# Patient Record
Sex: MALE | Race: OTHER | HISPANIC OR LATINO | URBAN - METROPOLITAN AREA
[De-identification: names, ages, dates, MRNs, and addresses within clinical notes are randomized per-mention and may not be internally consistent; named-entity substitution may affect disease eponyms.]

---

## 2018-01-01 ENCOUNTER — OUTPATIENT (OUTPATIENT)
Dept: OUTPATIENT SERVICES | Facility: HOSPITAL | Age: 53
LOS: 1 days | End: 2018-01-01
Payer: MEDICAID

## 2018-01-01 PROCEDURE — G9001: CPT

## 2018-01-05 DIAGNOSIS — R69 ILLNESS, UNSPECIFIED: ICD-10-CM

## 2018-01-27 ENCOUNTER — EMERGENCY (EMERGENCY)
Facility: HOSPITAL | Age: 53
LOS: 1 days | Discharge: HOME | End: 2018-01-27
Attending: EMERGENCY MEDICINE | Admitting: EMERGENCY MEDICINE
Payer: COMMERCIAL

## 2018-01-27 VITALS
OXYGEN SATURATION: 98 % | DIASTOLIC BLOOD PRESSURE: 86 MMHG | HEIGHT: 68 IN | HEART RATE: 110 BPM | RESPIRATION RATE: 18 BRPM | TEMPERATURE: 99 F | WEIGHT: 279.99 LBS | SYSTOLIC BLOOD PRESSURE: 148 MMHG

## 2018-01-27 VITALS
HEART RATE: 94 BPM | OXYGEN SATURATION: 97 % | TEMPERATURE: 98 F | DIASTOLIC BLOOD PRESSURE: 84 MMHG | SYSTOLIC BLOOD PRESSURE: 143 MMHG | RESPIRATION RATE: 18 BRPM

## 2018-01-27 LAB
ALBUMIN SERPL ELPH-MCNC: 4.1 G/DL — SIGNIFICANT CHANGE UP (ref 3.3–5.2)
ALP SERPL-CCNC: 80 U/L — SIGNIFICANT CHANGE UP (ref 40–120)
ALT FLD-CCNC: 16 U/L — SIGNIFICANT CHANGE UP
ANION GAP SERPL CALC-SCNC: 15 MMOL/L — SIGNIFICANT CHANGE UP (ref 5–17)
AST SERPL-CCNC: 17 U/L — SIGNIFICANT CHANGE UP
BASOPHILS # BLD AUTO: 0 K/UL — SIGNIFICANT CHANGE UP (ref 0–0.2)
BASOPHILS NFR BLD AUTO: 0.2 % — SIGNIFICANT CHANGE UP (ref 0–2)
BILIRUB SERPL-MCNC: <0.2 MG/DL — LOW (ref 0.4–2)
BUN SERPL-MCNC: 19 MG/DL — SIGNIFICANT CHANGE UP (ref 8–20)
CALCIUM SERPL-MCNC: 9.3 MG/DL — SIGNIFICANT CHANGE UP (ref 8.6–10.2)
CHLORIDE SERPL-SCNC: 104 MMOL/L — SIGNIFICANT CHANGE UP (ref 98–107)
CO2 SERPL-SCNC: 26 MMOL/L — SIGNIFICANT CHANGE UP (ref 22–29)
CREAT SERPL-MCNC: 0.99 MG/DL — SIGNIFICANT CHANGE UP (ref 0.5–1.3)
EOSINOPHIL # BLD AUTO: 0.3 K/UL — SIGNIFICANT CHANGE UP (ref 0–0.5)
EOSINOPHIL NFR BLD AUTO: 2.4 % — SIGNIFICANT CHANGE UP (ref 0–6)
GLUCOSE SERPL-MCNC: 119 MG/DL — HIGH (ref 70–115)
HCT VFR BLD CALC: 37.6 % — LOW (ref 42–52)
HGB BLD-MCNC: 11.9 G/DL — LOW (ref 14–18)
LYMPHOCYTES # BLD AUTO: 35.8 % — SIGNIFICANT CHANGE UP (ref 20–55)
LYMPHOCYTES # BLD AUTO: 5 K/UL — HIGH (ref 1–4.8)
MCHC RBC-ENTMCNC: 26.3 PG — LOW (ref 27–31)
MCHC RBC-ENTMCNC: 31.6 G/DL — LOW (ref 32–36)
MCV RBC AUTO: 83 FL — SIGNIFICANT CHANGE UP (ref 80–94)
MONOCYTES # BLD AUTO: 0.9 K/UL — HIGH (ref 0–0.8)
MONOCYTES NFR BLD AUTO: 6.2 % — SIGNIFICANT CHANGE UP (ref 3–10)
NEUTROPHILS # BLD AUTO: 7.6 K/UL — SIGNIFICANT CHANGE UP (ref 1.8–8)
NEUTROPHILS NFR BLD AUTO: 55 % — SIGNIFICANT CHANGE UP (ref 37–73)
PLATELET # BLD AUTO: 299 K/UL — SIGNIFICANT CHANGE UP (ref 150–400)
POTASSIUM SERPL-MCNC: 4.3 MMOL/L — SIGNIFICANT CHANGE UP (ref 3.5–5.3)
POTASSIUM SERPL-SCNC: 4.3 MMOL/L — SIGNIFICANT CHANGE UP (ref 3.5–5.3)
PROT SERPL-MCNC: 7.4 G/DL — SIGNIFICANT CHANGE UP (ref 6.6–8.7)
RBC # BLD: 4.53 M/UL — LOW (ref 4.6–6.2)
RBC # FLD: 15.8 % — HIGH (ref 11–15.6)
SODIUM SERPL-SCNC: 145 MMOL/L — SIGNIFICANT CHANGE UP (ref 135–145)
WBC # BLD: 13.9 K/UL — HIGH (ref 4.8–10.8)
WBC # FLD AUTO: 13.9 K/UL — HIGH (ref 4.8–10.8)

## 2018-01-27 PROCEDURE — 74177 CT ABD & PELVIS W/CONTRAST: CPT

## 2018-01-27 PROCEDURE — 99284 EMERGENCY DEPT VISIT MOD MDM: CPT

## 2018-01-27 PROCEDURE — 85027 COMPLETE CBC AUTOMATED: CPT

## 2018-01-27 PROCEDURE — 96374 THER/PROPH/DIAG INJ IV PUSH: CPT | Mod: XU

## 2018-01-27 PROCEDURE — 80053 COMPREHEN METABOLIC PANEL: CPT

## 2018-01-27 PROCEDURE — 99284 EMERGENCY DEPT VISIT MOD MDM: CPT | Mod: 25

## 2018-01-27 PROCEDURE — 74177 CT ABD & PELVIS W/CONTRAST: CPT | Mod: 26

## 2018-01-27 PROCEDURE — 36415 COLL VENOUS BLD VENIPUNCTURE: CPT

## 2018-01-27 RX ORDER — ACETAMINOPHEN 500 MG
975 TABLET ORAL ONCE
Qty: 0 | Refills: 0 | Status: COMPLETED | OUTPATIENT
Start: 2018-01-27 | End: 2018-01-27

## 2018-01-27 RX ORDER — SODIUM CHLORIDE 9 MG/ML
1000 INJECTION INTRAMUSCULAR; INTRAVENOUS; SUBCUTANEOUS ONCE
Qty: 0 | Refills: 0 | Status: COMPLETED | OUTPATIENT
Start: 2018-01-27 | End: 2018-01-27

## 2018-01-27 RX ADMIN — SODIUM CHLORIDE 1000 MILLILITER(S): 9 INJECTION INTRAMUSCULAR; INTRAVENOUS; SUBCUTANEOUS at 20:03

## 2018-01-27 RX ADMIN — Medication 975 MILLIGRAM(S): at 20:03

## 2018-01-27 RX ADMIN — Medication 100 MILLIGRAM(S): at 20:02

## 2018-01-27 RX ADMIN — Medication 975 MILLIGRAM(S): at 20:45

## 2018-01-27 NOTE — ED STATDOCS - SKIN, MLM
[id number] area of induration, erythema and tenderness with a central ulceration, no fluctuance or active drainage

## 2018-01-27 NOTE — ED ADULT NURSE NOTE - OBJECTIVE STATEMENT
pt reports pain to rlq starting this afternoon. pt has edematous, reddened with purulent drainage. pt denies fevers, chills. a and o x3. breathing even and unlabored. sitting calm in bed. will continue to monitor.

## 2018-01-27 NOTE — ED STATDOCS - MEDICAL DECISION MAKING DETAILS
no fluctuance on exam, no abscess on CT. Open and it was draining. Advised warm compresses. pt states he feels better, vitals improved, stable for dc on PO Abx

## 2018-01-27 NOTE — ED STATDOCS - OBJECTIVE STATEMENT
This is a 52 year old male presenting to the ED c/o pain and swelling to abdominal wall that onset last night. Pt suspects he was bitten by an insect, purulent drainage from bite today. He endorses constant, throbbing pain at site, no radiation of pain. Pt endorses nausea no vomiting, diarrhea, fever or chills. No further complaints at this time.

## 2018-02-01 ENCOUNTER — OUTPATIENT (OUTPATIENT)
Dept: OUTPATIENT SERVICES | Facility: HOSPITAL | Age: 53
LOS: 1 days | End: 2018-02-01
Payer: MEDICAID

## 2018-02-05 DIAGNOSIS — R69 ILLNESS, UNSPECIFIED: ICD-10-CM

## 2018-06-01 PROCEDURE — G9005: CPT

## 2018-07-01 ENCOUNTER — OUTPATIENT (OUTPATIENT)
Dept: OUTPATIENT SERVICES | Facility: HOSPITAL | Age: 53
LOS: 1 days | End: 2018-07-01
Payer: MEDICAID

## 2018-07-16 PROBLEM — Z00.00 ENCOUNTER FOR PREVENTIVE HEALTH EXAMINATION: Status: ACTIVE | Noted: 2018-07-16

## 2018-07-20 DIAGNOSIS — Z71.89 OTHER SPECIFIED COUNSELING: ICD-10-CM

## 2018-07-21 PROBLEM — I10 ESSENTIAL (PRIMARY) HYPERTENSION: Chronic | Status: ACTIVE | Noted: 2018-01-27

## 2018-08-15 ENCOUNTER — APPOINTMENT (OUTPATIENT)
Dept: MRI IMAGING | Facility: CLINIC | Age: 53
End: 2018-08-15
Payer: MEDICAID

## 2018-08-15 ENCOUNTER — OUTPATIENT (OUTPATIENT)
Dept: OUTPATIENT SERVICES | Facility: HOSPITAL | Age: 53
LOS: 1 days | End: 2018-08-15
Payer: MEDICAID

## 2018-08-15 DIAGNOSIS — Z00.8 ENCOUNTER FOR OTHER GENERAL EXAMINATION: ICD-10-CM

## 2018-08-15 PROCEDURE — 72148 MRI LUMBAR SPINE W/O DYE: CPT | Mod: 26

## 2018-08-15 PROCEDURE — 72148 MRI LUMBAR SPINE W/O DYE: CPT

## 2019-02-28 ENCOUNTER — EMERGENCY (EMERGENCY)
Facility: HOSPITAL | Age: 54
LOS: 1 days | Discharge: HOME | End: 2019-02-28
Attending: EMERGENCY MEDICINE
Payer: COMMERCIAL

## 2019-02-28 VITALS
RESPIRATION RATE: 18 BRPM | SYSTOLIC BLOOD PRESSURE: 103 MMHG | TEMPERATURE: 98 F | HEART RATE: 102 BPM | OXYGEN SATURATION: 98 % | DIASTOLIC BLOOD PRESSURE: 73 MMHG

## 2019-02-28 VITALS
OXYGEN SATURATION: 99 % | HEART RATE: 104 BPM | WEIGHT: 279.99 LBS | RESPIRATION RATE: 20 BRPM | DIASTOLIC BLOOD PRESSURE: 73 MMHG | TEMPERATURE: 98 F | SYSTOLIC BLOOD PRESSURE: 115 MMHG | HEIGHT: 68 IN

## 2019-02-28 LAB
ALBUMIN SERPL ELPH-MCNC: 4.4 G/DL — SIGNIFICANT CHANGE UP (ref 3.3–5.2)
ALP SERPL-CCNC: 64 U/L — SIGNIFICANT CHANGE UP (ref 40–120)
ALT FLD-CCNC: 36 U/L — SIGNIFICANT CHANGE UP
AMMONIA BLD-MCNC: 55 UMOL/L — SIGNIFICANT CHANGE UP (ref 11–55)
ANION GAP SERPL CALC-SCNC: 14 MMOL/L — SIGNIFICANT CHANGE UP (ref 5–17)
APPEARANCE UR: CLEAR — SIGNIFICANT CHANGE UP
AST SERPL-CCNC: 34 U/L — SIGNIFICANT CHANGE UP
BASOPHILS # BLD AUTO: 0 K/UL — SIGNIFICANT CHANGE UP (ref 0–0.2)
BASOPHILS NFR BLD AUTO: 0.2 % — SIGNIFICANT CHANGE UP (ref 0–2)
BILIRUB SERPL-MCNC: 0.5 MG/DL — SIGNIFICANT CHANGE UP (ref 0.4–2)
BILIRUB UR-MCNC: NEGATIVE — SIGNIFICANT CHANGE UP
BUN SERPL-MCNC: 14 MG/DL — SIGNIFICANT CHANGE UP (ref 8–20)
CALCIUM SERPL-MCNC: 10 MG/DL — SIGNIFICANT CHANGE UP (ref 8.6–10.2)
CHLORIDE SERPL-SCNC: 94 MMOL/L — LOW (ref 98–107)
CO2 SERPL-SCNC: 28 MMOL/L — SIGNIFICANT CHANGE UP (ref 22–29)
COLOR SPEC: YELLOW — SIGNIFICANT CHANGE UP
CREAT SERPL-MCNC: 1.37 MG/DL — HIGH (ref 0.5–1.3)
DIFF PNL FLD: NEGATIVE — SIGNIFICANT CHANGE UP
EOSINOPHIL # BLD AUTO: 0.1 K/UL — SIGNIFICANT CHANGE UP (ref 0–0.5)
EOSINOPHIL NFR BLD AUTO: 1.4 % — SIGNIFICANT CHANGE UP (ref 0–5)
GLUCOSE SERPL-MCNC: 105 MG/DL — SIGNIFICANT CHANGE UP (ref 70–115)
GLUCOSE UR QL: NEGATIVE MG/DL — SIGNIFICANT CHANGE UP
HCT VFR BLD CALC: 44.8 % — SIGNIFICANT CHANGE UP (ref 42–52)
HGB BLD-MCNC: 14.6 G/DL — SIGNIFICANT CHANGE UP (ref 14–18)
KETONES UR-MCNC: ABNORMAL
LEUKOCYTE ESTERASE UR-ACNC: NEGATIVE — SIGNIFICANT CHANGE UP
LIDOCAIN IGE QN: 15 U/L — LOW (ref 22–51)
LYMPHOCYTES # BLD AUTO: 3.4 K/UL — SIGNIFICANT CHANGE UP (ref 1–4.8)
LYMPHOCYTES # BLD AUTO: 39.3 % — SIGNIFICANT CHANGE UP (ref 20–55)
MCHC RBC-ENTMCNC: 25.9 PG — LOW (ref 27–31)
MCHC RBC-ENTMCNC: 32.6 G/DL — SIGNIFICANT CHANGE UP (ref 32–36)
MCV RBC AUTO: 79.4 FL — LOW (ref 80–94)
MONOCYTES # BLD AUTO: 0.7 K/UL — SIGNIFICANT CHANGE UP (ref 0–0.8)
MONOCYTES NFR BLD AUTO: 8.6 % — SIGNIFICANT CHANGE UP (ref 3–10)
NEUTROPHILS # BLD AUTO: 4.3 K/UL — SIGNIFICANT CHANGE UP (ref 1.8–8)
NEUTROPHILS NFR BLD AUTO: 50.1 % — SIGNIFICANT CHANGE UP (ref 37–73)
NITRITE UR-MCNC: NEGATIVE — SIGNIFICANT CHANGE UP
PH UR: 6.5 — SIGNIFICANT CHANGE UP (ref 5–8)
PLATELET # BLD AUTO: 334 K/UL — SIGNIFICANT CHANGE UP (ref 150–400)
POTASSIUM SERPL-MCNC: 3.7 MMOL/L — SIGNIFICANT CHANGE UP (ref 3.5–5.3)
POTASSIUM SERPL-SCNC: 3.7 MMOL/L — SIGNIFICANT CHANGE UP (ref 3.5–5.3)
PROT SERPL-MCNC: 8.3 G/DL — SIGNIFICANT CHANGE UP (ref 6.6–8.7)
PROT UR-MCNC: 30 MG/DL
RBC # BLD: 5.64 M/UL — SIGNIFICANT CHANGE UP (ref 4.6–6.2)
RBC # FLD: 15.1 % — SIGNIFICANT CHANGE UP (ref 11–15.6)
SODIUM SERPL-SCNC: 136 MMOL/L — SIGNIFICANT CHANGE UP (ref 135–145)
SP GR SPEC: 1.01 — SIGNIFICANT CHANGE UP (ref 1.01–1.02)
UROBILINOGEN FLD QL: 1 MG/DL
WBC # BLD: 8.6 K/UL — SIGNIFICANT CHANGE UP (ref 4.8–10.8)
WBC # FLD AUTO: 8.6 K/UL — SIGNIFICANT CHANGE UP (ref 4.8–10.8)

## 2019-02-28 PROCEDURE — 82140 ASSAY OF AMMONIA: CPT

## 2019-02-28 PROCEDURE — 74177 CT ABD & PELVIS W/CONTRAST: CPT

## 2019-02-28 PROCEDURE — 96374 THER/PROPH/DIAG INJ IV PUSH: CPT | Mod: XU

## 2019-02-28 PROCEDURE — 99284 EMERGENCY DEPT VISIT MOD MDM: CPT | Mod: 25

## 2019-02-28 PROCEDURE — 85027 COMPLETE CBC AUTOMATED: CPT

## 2019-02-28 PROCEDURE — 99284 EMERGENCY DEPT VISIT MOD MDM: CPT

## 2019-02-28 PROCEDURE — 74177 CT ABD & PELVIS W/CONTRAST: CPT | Mod: 26

## 2019-02-28 PROCEDURE — 36415 COLL VENOUS BLD VENIPUNCTURE: CPT

## 2019-02-28 PROCEDURE — 83690 ASSAY OF LIPASE: CPT

## 2019-02-28 PROCEDURE — 81001 URINALYSIS AUTO W/SCOPE: CPT

## 2019-02-28 PROCEDURE — 80053 COMPREHEN METABOLIC PANEL: CPT

## 2019-02-28 RX ORDER — METFORMIN HYDROCHLORIDE 850 MG/1
2 TABLET ORAL
Qty: 0 | Refills: 0 | COMMUNITY

## 2019-02-28 RX ORDER — ONDANSETRON 8 MG/1
8 TABLET, FILM COATED ORAL ONCE
Qty: 0 | Refills: 0 | Status: COMPLETED | OUTPATIENT
Start: 2019-02-28 | End: 2019-02-28

## 2019-02-28 RX ORDER — AMLODIPINE BESYLATE AND BENAZEPRIL HYDROCHLORIDE 10; 20 MG/1; MG/1
1 CAPSULE ORAL
Qty: 0 | Refills: 0 | COMMUNITY

## 2019-02-28 RX ORDER — ONDANSETRON 8 MG/1
1 TABLET, FILM COATED ORAL
Qty: 6 | Refills: 0 | OUTPATIENT
Start: 2019-02-28 | End: 2019-03-01

## 2019-02-28 RX ORDER — SODIUM CHLORIDE 9 MG/ML
1000 INJECTION INTRAMUSCULAR; INTRAVENOUS; SUBCUTANEOUS ONCE
Qty: 0 | Refills: 0 | Status: COMPLETED | OUTPATIENT
Start: 2019-02-28 | End: 2019-02-28

## 2019-02-28 RX ADMIN — SODIUM CHLORIDE 1000 MILLILITER(S): 9 INJECTION INTRAMUSCULAR; INTRAVENOUS; SUBCUTANEOUS at 12:04

## 2019-02-28 RX ADMIN — ONDANSETRON 8 MILLIGRAM(S): 8 TABLET, FILM COATED ORAL at 12:04

## 2019-02-28 NOTE — ED ADULT NURSE NOTE - OBJECTIVE STATEMENT
Patient arrived to ED today with c/o vomiting for the past two weeks intermittently and he is unable recently to keep food or fluids down.  Patient denies chest pain, SOB, numbness or tingling, fever.

## 2019-02-28 NOTE — ED ADULT NURSE REASSESSMENT NOTE - NS ED NURSE REASSESS COMMENT FT1
Patient resting in cart awake and alert x4, patient able to tolerate PO challenge, patient has no labored breathing, patient is in no acute distress.

## 2019-02-28 NOTE — ED ADULT TRIAGE NOTE - CHIEF COMPLAINT QUOTE
Pt ambulatory in ED c/o vomiting x2 week, reports " I can't keep any food down." Pt reports associated lower back pain.

## 2019-02-28 NOTE — ED STATDOCS - CLINICAL SUMMARY MEDICAL DECISION MAKING FREE TEXT BOX
pt with 2 weeks abdominal pain, poor PO intake, increased weakness, somnolence, inability to take medications, will obtain labs, CT, and reassess.

## 2019-02-28 NOTE — ED STATDOCS - PROGRESS NOTE DETAILS
PT evaluated by intake physician. HPI/PE/ROS as noted above. Will follow up plan per intake physician. Reviewed ct abd/pelvis, lab work, po challenge successful, pt reports improvement in sxs, will send zofran to pharamacy to the pharamacy, on re-assessment of patients abdomen no tenderness of palpation, pt explained results and d/c instructions

## 2019-02-28 NOTE — ED STATDOCS - OBJECTIVE STATEMENT
52 y/o M pt with PMHx HTN, DM presents to the ED c/o persistent vomiting for the past 3 weeks.  Pt states he has not been able to hold food down the past 3 weeks, notes having multiple episodes of vomiting with associated nausea, diffuse abdominal pain, and weakness.  Pt notes his only relief is when he is sleeping.  Pt had some teeth removed several weeks ago, relative notes his symptoms began shortly after this dental work.  Pt he takes metformin 500mg, and [id number] medications, has been taking his medications but he keeps vomiting them up.  No hx of diverticulitis, colitis.  No EtOH intake.  No sick contacts.  Denies fever, chills, CP, SOB.  No further acute complaints at this time.

## 2019-06-17 PROBLEM — E11.9 TYPE 2 DIABETES MELLITUS WITHOUT COMPLICATIONS: Chronic | Status: ACTIVE | Noted: 2019-02-28

## 2019-07-10 ENCOUNTER — APPOINTMENT (OUTPATIENT)
Dept: GASTROENTEROLOGY | Facility: CLINIC | Age: 54
End: 2019-07-10
Payer: MEDICAID

## 2019-07-10 VITALS
HEART RATE: 63 BPM | DIASTOLIC BLOOD PRESSURE: 70 MMHG | OXYGEN SATURATION: 98 % | RESPIRATION RATE: 16 BRPM | HEIGHT: 68 IN | SYSTOLIC BLOOD PRESSURE: 117 MMHG | WEIGHT: 280 LBS | BODY MASS INDEX: 42.44 KG/M2

## 2019-07-10 DIAGNOSIS — Z83.3 FAMILY HISTORY OF DIABETES MELLITUS: ICD-10-CM

## 2019-07-10 DIAGNOSIS — Z80.0 FAMILY HISTORY OF MALIGNANT NEOPLASM OF DIGESTIVE ORGANS: ICD-10-CM

## 2019-07-10 DIAGNOSIS — Z86.79 PERSONAL HISTORY OF OTHER DISEASES OF THE CIRCULATORY SYSTEM: ICD-10-CM

## 2019-07-10 DIAGNOSIS — Z78.9 OTHER SPECIFIED HEALTH STATUS: ICD-10-CM

## 2019-07-10 DIAGNOSIS — Z86.39 PERSONAL HISTORY OF OTHER ENDOCRINE, NUTRITIONAL AND METABOLIC DISEASE: ICD-10-CM

## 2019-07-10 DIAGNOSIS — Z82.49 FAMILY HISTORY OF ISCHEMIC HEART DISEASE AND OTHER DISEASES OF THE CIRCULATORY SYSTEM: ICD-10-CM

## 2019-07-10 PROCEDURE — 99204 OFFICE O/P NEW MOD 45 MIN: CPT

## 2019-07-10 RX ORDER — BUSPIRONE HYDROCHLORIDE 15 MG/1
15 TABLET ORAL
Refills: 0 | Status: ACTIVE | COMMUNITY

## 2019-07-10 RX ORDER — RISPERIDONE 1 MG/1
1 TABLET ORAL
Refills: 0 | Status: ACTIVE | COMMUNITY

## 2019-07-10 RX ORDER — CITALOPRAM HYDROBROMIDE 40 MG/1
40 TABLET, FILM COATED ORAL
Refills: 0 | Status: ACTIVE | COMMUNITY

## 2019-07-10 RX ORDER — AMLODIPINE BESYLATE 10 MG/1
10 TABLET ORAL
Refills: 0 | Status: ACTIVE | COMMUNITY

## 2019-07-10 RX ORDER — METOPROLOL TARTRATE 100 MG/1
100 TABLET, FILM COATED ORAL
Refills: 0 | Status: ACTIVE | COMMUNITY

## 2019-07-10 RX ORDER — MIRTAZAPINE 30 MG/1
30 TABLET, ORALLY DISINTEGRATING ORAL
Refills: 0 | Status: ACTIVE | COMMUNITY

## 2019-07-10 RX ORDER — METFORMIN HYDROCHLORIDE 1000 MG/1
1000 TABLET, COATED ORAL
Refills: 0 | Status: ACTIVE | COMMUNITY

## 2019-07-10 RX ORDER — CARISOPRODOL 350 MG/1
350 TABLET ORAL
Refills: 0 | Status: ACTIVE | COMMUNITY

## 2019-07-10 NOTE — ASSESSMENT
[FreeTextEntry1] : The patient is of average risk for colorectal cancer.  Will schedule routine screening colonoscopy at [facility].  TriLyte prep ordered.  The description of the colonoscopy procedure was discussed in depth as were the alternatives and risks, the alternatives including a barium enema, a CT scan, a CT colonography, or stool testing (hemoccult/FIT).  It was explained that although these alternatives may be useful and may give general information about problems within the colon, but they do not provide the in-depth information, direct visibility and the ability to resect polyps as a colonoscopy does.  The risks were thoroughly described and may include but are not limited to: bleeding (immediate or up to 14 days after the procedure), missed polyps and/or cancer that may not be seen during the procedure, rectal irritation, medication reaction (to sedation, or any other medications administered), irritation of the vein used for IV medication, infection, tear or perforation of the colon and rectum, abdominal bloating and cramping.  Additionally discussed were rare complications that may require additional treatment such as surgery, hospitalization, repeat endoscopy and/or blood transfusion.  Lastly, mentioned is a small risk of cardiopulmonary events such as loss of breathing and heart rhythm disturbances including rare cardiopulmonary arrest.

## 2019-07-10 NOTE — PHYSICAL EXAM
[General Appearance - Alert] : alert [General Appearance - In No Acute Distress] : in no acute distress [PERRL With Normal Accommodation] : pupils were equal in size, round, and reactive to light [Sclera] : the sclera and conjunctiva were normal [Extraocular Movements] : extraocular movements were intact [Auscultation Breath Sounds / Voice Sounds] : lungs were clear to auscultation bilaterally [Heart Sounds] : normal S1 and S2 [Heart Rate And Rhythm] : heart rate was normal and rhythm regular [Heart Sounds Gallop] : no gallops [Murmurs] : no murmurs [Edema] : there was no peripheral edema [Heart Sounds Pericardial Friction Rub] : no pericardial rub [Bowel Sounds] : normal bowel sounds [Abdomen Soft] : soft [] : no hepato-splenomegaly [Abdomen Tenderness] : non-tender [Abdomen Mass (___ Cm)] : no abdominal mass palpated [Cervical Lymph Nodes Enlarged Anterior Bilaterally] : anterior cervical [Cervical Lymph Nodes Enlarged Posterior Bilaterally] : posterior cervical [Nail Clubbing] : no clubbing  or cyanosis of the fingernails [Supraclavicular Lymph Nodes Enlarged Bilaterally] : supraclavicular [Skin Turgor] : normal skin turgor [Skin Color & Pigmentation] : normal skin color and pigmentation [No Focal Deficits] : no focal deficits [Person] : oriented to person [Place] : oriented to place [Time] : oriented to time [Flat] : flat [Normal Rhythm] : a normal rhythm [Normal Rate] : a normal rate [Normal Tone] : normal tone [FreeTextEntry1] : Class 4 airway

## 2019-07-10 NOTE — CONSULT LETTER
[Dear  ___] : Dear  [unfilled], [( Thank you for referring [unfilled] for consultation for _____ )] : Thank you for referring [unfilled] for consultation for [unfilled] [Please see my note below.] : Please see my note below. [Consult Closing:] : Thank you very much for allowing me to participate in the care of this patient.  If you have any questions, please do not hesitate to contact me. [FreeTextEntry3] : Very truly yours,\par \par LUISA Arreola MD\par [facility]\par Gastroenterology at [place]\par [address], [address]\[place] [address]\par Tel [phone]\par Fax [phone]

## 2019-07-10 NOTE — HISTORY OF PRESENT ILLNESS
[de-identified] : The patient was referred by PCP for evaluation for a screening colonoscopy.  There is no colonoscopy history.  The patient denies any changes in bowel habits, abdominal pain, rectal bleeding, nausea, vomiting, diarrhea, constipation or a family history of colorectal cancer.  The patient reports feeling well and has no complaints.

## 2019-09-09 ENCOUNTER — MEDICATION RENEWAL (OUTPATIENT)
Age: 54
End: 2019-09-09

## 2019-09-13 ENCOUNTER — APPOINTMENT (OUTPATIENT)
Dept: GASTROENTEROLOGY | Facility: HOSPITAL | Age: 54
End: 2019-09-13

## 2019-09-13 ENCOUNTER — RESULT REVIEW (OUTPATIENT)
Age: 54
End: 2019-09-13

## 2019-09-13 ENCOUNTER — OUTPATIENT (OUTPATIENT)
Dept: OUTPATIENT SERVICES | Facility: HOSPITAL | Age: 54
LOS: 1 days | End: 2019-09-13
Payer: COMMERCIAL

## 2019-09-13 DIAGNOSIS — Z12.11 ENCOUNTER FOR SCREENING FOR MALIGNANT NEOPLASM OF COLON: ICD-10-CM

## 2019-09-13 DIAGNOSIS — E66.01 MORBID (SEVERE) OBESITY DUE TO EXCESS CALORIES: ICD-10-CM

## 2019-09-13 LAB — GLUCOSE BLDC GLUCOMTR-MCNC: 81 MG/DL — SIGNIFICANT CHANGE UP (ref 70–99)

## 2019-09-13 PROCEDURE — 88305 TISSUE EXAM BY PATHOLOGIST: CPT | Mod: 26

## 2019-09-13 PROCEDURE — 82962 GLUCOSE BLOOD TEST: CPT

## 2019-09-13 PROCEDURE — 45385 COLONOSCOPY W/LESION REMOVAL: CPT

## 2019-09-13 PROCEDURE — 45385 COLONOSCOPY W/LESION REMOVAL: CPT | Mod: PT

## 2019-09-13 PROCEDURE — 88305 TISSUE EXAM BY PATHOLOGIST: CPT

## 2019-09-13 RX ORDER — POLYETHYLENE GLYOCOL 3350, SODIUM CHLORIDE, SODIUM BICARBONATE AND POTASSIUM CHLORIDE 420; 11.2; 5.72; 1.48 G/4L; G/4L; G/4L; G/4L
420 POWDER, FOR SOLUTION NASOGASTRIC; ORAL
Qty: 1 | Refills: 0 | Status: COMPLETED | COMMUNITY
Start: 2019-07-10 | End: 2019-09-13

## 2019-09-13 NOTE — PHYSICAL EXAM
[General Appearance - Alert] : alert [General Appearance - In No Acute Distress] : in no acute distress [Sclera] : the sclera and conjunctiva were normal [PERRL With Normal Accommodation] : pupils were equal in size, round, and reactive to light [FreeTextEntry1] : Class 4 airway [Extraocular Movements] : extraocular movements were intact [Auscultation Breath Sounds / Voice Sounds] : lungs were clear to auscultation bilaterally [Heart Rate And Rhythm] : heart rate was normal and rhythm regular [Heart Sounds] : normal S1 and S2 [Heart Sounds Gallop] : no gallops [Murmurs] : no murmurs [Heart Sounds Pericardial Friction Rub] : no pericardial rub [Edema] : there was no peripheral edema [Bowel Sounds] : normal bowel sounds [Abdomen Soft] : soft [Abdomen Tenderness] : non-tender [] : no hepato-splenomegaly [Abdomen Mass (___ Cm)] : no abdominal mass palpated [Cervical Lymph Nodes Enlarged Posterior Bilaterally] : posterior cervical [Cervical Lymph Nodes Enlarged Anterior Bilaterally] : anterior cervical [Supraclavicular Lymph Nodes Enlarged Bilaterally] : supraclavicular [Nail Clubbing] : no clubbing  or cyanosis of the fingernails [Skin Color & Pigmentation] : normal skin color and pigmentation [Skin Turgor] : normal skin turgor [No Focal Deficits] : no focal deficits

## 2019-09-13 NOTE — PROCEDURE
[With Snare Polypectomy] : snare polypectomy [Colon Cancer Screening] : colon cancer screening [Procedure Explained] : The procedure was explained [Allergies Reviewed] : allergies reviewed. [Risks] : Risks [Benefits] : benefits [Alternatives] : alternatives [Bleeding] : bleeding risk [Infection] : risk of infection [Consent Obtained] : written consent was obtained prior to the procedure and is detailed in the patient's record [Patient] : the patient [Bowel Prep Kit] : the patient took the appropriate bowel preparation kit as directed [Approved Diet Followed] : the patient avoided solid foods and adhered to the approved diet list for 24 hours prior to the procedure [Automated Blood Pressure Cuff] : automated blood pressure cuff [Cardiac Monitor] : cardiac monitor [Pulse Oximeter] : pulse oximeter [Propofol ___ mg IV] : Propofol [unfilled] ~Umg intravenously [2] : 2 [Sedation Clearance] : the patient was cleared for moderate sedation [Prep Qualtiy: ___] : Prep Quality:  [unfilled] [Withdrawal Time: ___] : Withdrawal Time:  [unfilled] [Performed By: ___] : Performed by:  CÉSAR [Abnormal Rectum] : an abnormal rectum [External Hemorrhoids] : external hemorrhoids [Normal Prostate] : a normal prostate [Cecum (Landmarks)] : and guided to the cecum which was identified by the anatomic landmarks of the appendiceal orifice and ileocecal valve [Insufflated] : insufflated [No Difficulty] : without difficulty [Single Pass Needed] : after a single pass [Retroflex View] : a retroflex view of the rectum was performed [Polyps] : polyps [Cold Snare Polypectomy] : cold snare polypectomy [Normal] : Normal [Hemorrhoids] : hemorrhoids [Sent to Pathology] : was sent to pathology for analysis [Tolerated Well] : the patient tolerated the procedure well [Vital Signs Stable] : the vital signs were stable [No Complications] : There were no complications [de-identified] : significantly increased rectal sphincter tone

## 2019-09-13 NOTE — PROCEDURE
[With Snare Polypectomy] : snare polypectomy [Colon Cancer Screening] : colon cancer screening [Procedure Explained] : The procedure was explained [Allergies Reviewed] : allergies reviewed. [Risks] : Risks [Benefits] : benefits [Alternatives] : alternatives [Bleeding] : bleeding risk [Infection] : risk of infection [Consent Obtained] : written consent was obtained prior to the procedure and is detailed in the patient's record [Patient] : the patient [Bowel Prep Kit] : the patient took the appropriate bowel preparation kit as directed [Approved Diet Followed] : the patient avoided solid foods and adhered to the approved diet list for 24 hours prior to the procedure [Automated Blood Pressure Cuff] : automated blood pressure cuff [Cardiac Monitor] : cardiac monitor [Pulse Oximeter] : pulse oximeter [Propofol ___ mg IV] : Propofol [unfilled] ~Umg intravenously [2] : 2 [Sedation Clearance] : the patient was cleared for moderate sedation [Prep Qualtiy: ___] : Prep Quality:  [unfilled] [Withdrawal Time: ___] : Withdrawal Time:  [unfilled] [Performed By: ___] : Performed by:  CÉSAR [Abnormal Rectum] : an abnormal rectum [External Hemorrhoids] : external hemorrhoids [Normal Prostate] : a normal prostate [Cecum (Landmarks)] : and guided to the cecum which was identified by the anatomic landmarks of the appendiceal orifice and ileocecal valve [No Difficulty] : without difficulty [Insufflated] : insufflated [Single Pass Needed] : after a single pass [Retroflex View] : a retroflex view of the rectum was performed [Polyps] : polyps [Cold Snare Polypectomy] : cold snare polypectomy [Normal] : Normal [Hemorrhoids] : hemorrhoids [Sent to Pathology] : was sent to pathology for analysis [Tolerated Well] : the patient tolerated the procedure well [Vital Signs Stable] : the vital signs were stable [No Complications] : There were no complications [de-identified] : significantly increased rectal sphincter tone

## 2019-09-13 NOTE — PHYSICAL EXAM
[General Appearance - Alert] : alert [General Appearance - In No Acute Distress] : in no acute distress [Sclera] : the sclera and conjunctiva were normal [PERRL With Normal Accommodation] : pupils were equal in size, round, and reactive to light [Extraocular Movements] : extraocular movements were intact [FreeTextEntry1] : Class 4 airway [Auscultation Breath Sounds / Voice Sounds] : lungs were clear to auscultation bilaterally [Heart Rate And Rhythm] : heart rate was normal and rhythm regular [Heart Sounds] : normal S1 and S2 [Heart Sounds Gallop] : no gallops [Murmurs] : no murmurs [Heart Sounds Pericardial Friction Rub] : no pericardial rub [Edema] : there was no peripheral edema [Bowel Sounds] : normal bowel sounds [Abdomen Soft] : soft [Abdomen Tenderness] : non-tender [] : no hepato-splenomegaly [Abdomen Mass (___ Cm)] : no abdominal mass palpated [Cervical Lymph Nodes Enlarged Posterior Bilaterally] : posterior cervical [Cervical Lymph Nodes Enlarged Anterior Bilaterally] : anterior cervical [Supraclavicular Lymph Nodes Enlarged Bilaterally] : supraclavicular [Nail Clubbing] : no clubbing  or cyanosis of the fingernails [Skin Color & Pigmentation] : normal skin color and pigmentation [Skin Turgor] : normal skin turgor [No Focal Deficits] : no focal deficits

## 2019-09-13 NOTE — ASSESSMENT
[FreeTextEntry1] : This is a 54-year-old man presenting for an index screening colonoscopy.  The exam was notable for significantly increased sphincter tone on digital rectal examination.  Large external hemorrhoids were also seen on MERARI.  A single 6 mm sessile polyp was found in the transverse colon and was resected/retrieved via cold snare polypectomy.  The remainder of the exam was otherwise unremarkable.\par \par Follow up pathology\par Repeat colonoscopy in 5 years for surveillance.

## 2019-09-17 LAB — SURGICAL PATHOLOGY STUDY: SIGNIFICANT CHANGE UP

## 2019-09-18 DIAGNOSIS — D12.6 BENIGN NEOPLASM OF COLON, UNSPECIFIED: ICD-10-CM

## 2019-09-24 ENCOUNTER — OTHER (OUTPATIENT)
Age: 54
End: 2019-09-24

## 2019-12-01 ENCOUNTER — OUTPATIENT (OUTPATIENT)
Dept: OUTPATIENT SERVICES | Facility: HOSPITAL | Age: 54
LOS: 1 days | End: 2019-12-01
Payer: MEDICAID

## 2019-12-01 PROCEDURE — H0002: CPT

## 2020-04-20 DIAGNOSIS — Z71.89 OTHER SPECIFIED COUNSELING: ICD-10-CM

## 2020-08-24 ENCOUNTER — APPOINTMENT (OUTPATIENT)
Dept: ORTHOPEDIC SURGERY | Facility: CLINIC | Age: 55
End: 2020-08-24

## 2020-09-10 ENCOUNTER — APPOINTMENT (OUTPATIENT)
Dept: ORTHOPEDIC SURGERY | Facility: CLINIC | Age: 55
End: 2020-09-10
Payer: MEDICAID

## 2020-09-10 PROCEDURE — 73030 X-RAY EXAM OF SHOULDER: CPT | Mod: LT

## 2020-09-10 PROCEDURE — 20610 DRAIN/INJ JOINT/BURSA W/O US: CPT | Mod: LT

## 2020-09-10 PROCEDURE — 99204 OFFICE O/P NEW MOD 45 MIN: CPT | Mod: 25

## 2020-09-14 NOTE — PHYSICAL EXAM
[de-identified] : Physical Exam:\par General: Well appearing, no acute distress, A&Ox3\par Neurologic: No focal deficits\par Head: NCAT without abrasions, lacerations, or ecchymosis to head, face, or scalp\par Eyes: No scleral icterus, no gross abnormalities\par Respiratory: Equal chest wall expansion bilaterally, no accessory muscle use\par Lymphatic: No lymphadenopathy palpated\par Skin: Warm and dry\par Psychiatric: Normal mood and affect\par \par C-Spine\par Palpation: Tenderness to paraspinal muscles and trapezius muscle\par ROM: side bending, symmetrical. Pain with extension and flexion of the neck.\par Reflexes: C5-7 [normal]\par \par Left Shoulder\par ·	Inspection/Palpation: No tenderness, no crepitus, no deformities\par ·	Range of Motion: no crepitus with ROM; Active ; ER at side 35; IR to L1; Passive  ; ER at side 45; IR to L1\par ·	Strength: forward elevation in scapular plane [4/5], internal rotation [4/5], external rotation [4/5], adduction [4/5] and abduction [4/5]\par ·	Stability: no joint instability on provocative testing\par ·	Tests: Rosas test positive, Neer positive, positive drop arm test secondary to pain, bear hug test positive, Napolean sign POS, cross arm adduction positive, lift off sign positive, hornblowers sign POS, speeds test negative, Yergason's test negative, Velazquez's Active Compression test negative, whipple test positive, bicep's load II test negative\par \par Right Shoulder\par ·	Inspection/Palpation: no tenderness, swelling or deformities\par ·	Range of Motion: full and painless in all planes, no crepitus\par ·	Strength: forward elevation in scapular plane 5/5, internal rotation 5/5, external rotation 5/5, adduction 5/5 and abduction 5/5\par ·	Stability: no joint instability on provocative testing\par Tests: Rosas test negative, Neer sign negative, negative drop arm test secondary to pain, bear hug test negative, Napolean sign negative, cross arm adduction negative, lift off sign positive, hornblowers sign negative, speeds test negative, Yergason's test negative, no bicipital groove tenderness, Velazquez's Active Compression test negative\par  [de-identified] :  MRI of the scapula was unremarkable.  \par \par MRI of the left shoulder reveals mild supraspinatus tendinosis with mild undersurface partial thickness tearing, mild AC jt osteoarthritis, mild infraspinatus tendon tendinosis, LHB tendon exhibits moderate tenosynovitis around the extracapsular segment of the tendon and a small posterosuperior labrum.\par \par  4 views of the left shoulder were performed today and available for me to review. They demonstrate no fracture or dislocation. [No] glenohumeral degenerative changes noted. [No] AC joint degenerative changes noted. No gross deformities noted.

## 2020-09-14 NOTE — PROCEDURE
[de-identified] : Injection: Left shoulder (Subacromial).\par Indication: RTC Tendinopathy\par \par A discussion was had with the patient regarding this procedure and all questions were answered. All risks, benefits and alternatives were discussed. These included but were not limited to bleeding, infection, and allergic reaction. Alcohol was used to clean the skin, and betadine was used to sterilize and prep the area in the posterior aspect of the left shoulder. Ethyl chloride spray was then used as a topical anesthetic. A 22-gauge needle was used to inject 3cc 1% xylocaine, 2cc 0.25% bupivacaine and 1cc of 40mg/mL triamcinolone acetonide into the left subacromial space. A sterile bandage was then applied. The patient tolerated the procedure well and there were no complications.\par
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
## 2020-11-09 ENCOUNTER — APPOINTMENT (OUTPATIENT)
Dept: ORTHOPEDIC SURGERY | Facility: CLINIC | Age: 55
End: 2020-11-09
Payer: MEDICAID

## 2020-11-09 VITALS
HEART RATE: 79 BPM | SYSTOLIC BLOOD PRESSURE: 127 MMHG | BODY MASS INDEX: 35.77 KG/M2 | WEIGHT: 236 LBS | DIASTOLIC BLOOD PRESSURE: 82 MMHG | HEIGHT: 68 IN

## 2020-11-09 DIAGNOSIS — M19.019 PRIMARY OSTEOARTHRITIS, UNSPECIFIED SHOULDER: ICD-10-CM

## 2020-11-09 DIAGNOSIS — M25.512 PAIN IN LEFT SHOULDER: ICD-10-CM

## 2020-11-09 DIAGNOSIS — M67.819 OTHER SPECIFIED DISORDERS OF SYNOVIUM AND TENDON, UNSPECIFIED SHOULDER: ICD-10-CM

## 2020-11-09 PROCEDURE — 99214 OFFICE O/P EST MOD 30 MIN: CPT | Mod: 25

## 2020-11-09 PROCEDURE — 20610 DRAIN/INJ JOINT/BURSA W/O US: CPT | Mod: LT

## 2020-11-09 PROCEDURE — 99072 ADDL SUPL MATRL&STAF TM PHE: CPT

## 2020-11-09 NOTE — PHYSICAL EXAM
[de-identified] : Physical Exam:\par General: Well appearing, no acute distress, A&Ox3\par Neurologic: No focal deficits\par Head: NCAT without abrasions, lacerations, or ecchymosis to head, face, or scalp\par Eyes: No scleral icterus, no gross abnormalities\par Respiratory: Equal chest wall expansion bilaterally, no accessory muscle use\par Lymphatic: No lymphadenopathy palpated\par Skin: Warm and dry\par Psychiatric: Normal mood and affect\par \par C-Spine\par Palpation: Tenderness to paraspinal muscles and trapezius muscle\par ROM: side bending, symmetrical. Pain with extension and flexion of the neck.\par Reflexes: C5-7 [normal]\par \par Left Shoulder\par ·	Inspection/Palpation: No tenderness, no crepitus, no deformities\par ·	Range of Motion: no crepitus with ROM; Active ; ER at side 35; IR to L1; Passive  ; ER at side 45; IR to L1\par ·	Strength: forward elevation in scapular plane [4/5], internal rotation [4/5], external rotation [4/5], adduction [4/5] and abduction [4/5]\par ·	Stability: no joint instability on provocative testing\par ·	Tests: Rosas test positive, Neer positive, positive drop arm test secondary to pain, bear hug test positive, Napolean sign POS, cross arm adduction positive, lift off sign positive, hornblowers sign POS, speeds test negative, Yergason's test negative, Velazquez's Active Compression test negative, whipple test positive, bicep's load II test negative\par \par Right Shoulder\par ·	Inspection/Palpation: no tenderness, swelling or deformities\par ·	Range of Motion: full and painless in all planes, no crepitus\par ·	Strength: forward elevation in scapular plane 5/5, internal rotation 5/5, external rotation 5/5, adduction 5/5 and abduction 5/5\par ·	Stability: no joint instability on provocative testing\par Tests: Rosas test negative, Neer sign negative, negative drop arm test secondary to pain, bear hug test negative, Napolean sign negative, cross arm adduction negative, lift off sign positive, hornblowers sign negative, speeds test negative, Yergason's test negative, no bicipital groove tenderness, Velazquez's Active Compression test negative\par  [de-identified] :  MRI of the scapula was unremarkable.  \par \par MRI of the left shoulder reveals mild supraspinatus tendinosis with mild undersurface partial thickness tearing, mild AC jt osteoarthritis, mild infraspinatus tendon tendinosis, LHB tendon exhibits moderate tenosynovitis around the extracapsular segment of the tendon and a small posterosuperior labrum.

## 2020-11-09 NOTE — HISTORY OF PRESENT ILLNESS
[de-identified] : PHI is a 55 year male who presents today for follow up for left shoulder pain. At last evaluation he was given a cortisone injection for this issue and was recommended to start formal PT.\par \par Currently, he admits to improvements with cortisone injection that recently wore off. He is requesting another one at this time.
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
## 2021-01-19 NOTE — ED ADULT TRIAGE NOTE - LOCATION:
Thank you for visiting the [facility] Emergency Department. You were seen today for fever, cough, and congestion.    We did blood test and urine test which were both reassuring today.    Please follow-up with your primary doctor in 24 to 48 hours for repeat evaluation.    Please return to the emergency department if you experience persistent fevers, chest pain, problems with breathing, abdominal pain, vomiting, diarrhea, severe headache, confusion, or any other concerning symptoms.    Thank you for allowing us to participate in your care today.  
Right arm;

## 2021-04-01 ENCOUNTER — EMERGENCY (EMERGENCY)
Facility: HOSPITAL | Age: 56
LOS: 1 days | Discharge: HOME | End: 2021-04-01
Payer: COMMERCIAL

## 2021-04-01 VITALS
DIASTOLIC BLOOD PRESSURE: 93 MMHG | TEMPERATURE: 99 F | WEIGHT: 220.02 LBS | HEART RATE: 86 BPM | HEIGHT: 68 IN | RESPIRATION RATE: 18 BRPM | OXYGEN SATURATION: 96 % | SYSTOLIC BLOOD PRESSURE: 153 MMHG

## 2021-04-01 LAB — SARS-COV-2 RNA SPEC QL NAA+PROBE: SIGNIFICANT CHANGE UP

## 2021-04-01 PROCEDURE — U0003: CPT

## 2021-04-01 PROCEDURE — U0005: CPT

## 2021-04-01 PROCEDURE — 99283 EMERGENCY DEPT VISIT LOW MDM: CPT

## 2021-04-01 PROCEDURE — 99282 EMERGENCY DEPT VISIT SF MDM: CPT

## 2021-04-01 NOTE — ED PROVIDER NOTE - PATIENT PORTAL LINK FT
You can access the FollowMyHealth Patient Portal offered by [facility] by registering at the following website: [email or web address]. By joining [facility]’s FollowMyHealth portal, you will also be able to view your health information using other applications (apps) compatible with our system.

## 2021-04-01 NOTE — ED PROVIDER NOTE - OBJECTIVE STATEMENT
54 yo male presenting to the ER for COVID-19 testing. Patient asymptomatic. +Exposure. No complaints.

## 2021-06-07 ENCOUNTER — EMERGENCY (EMERGENCY)
Facility: HOSPITAL | Age: 56
LOS: 1 days | Discharge: HOME | End: 2021-06-07
Payer: COMMERCIAL

## 2021-06-07 VITALS
SYSTOLIC BLOOD PRESSURE: 128 MMHG | TEMPERATURE: 99 F | WEIGHT: 225.09 LBS | DIASTOLIC BLOOD PRESSURE: 74 MMHG | HEIGHT: 68 IN | RESPIRATION RATE: 18 BRPM | OXYGEN SATURATION: 99 % | HEART RATE: 85 BPM

## 2021-06-07 LAB — SARS-COV-2 RNA SPEC QL NAA+PROBE: SIGNIFICANT CHANGE UP

## 2021-06-07 PROCEDURE — 99283 EMERGENCY DEPT VISIT LOW MDM: CPT

## 2021-06-07 PROCEDURE — U0003: CPT

## 2021-06-07 PROCEDURE — U0005: CPT

## 2021-06-07 PROCEDURE — 99282 EMERGENCY DEPT VISIT SF MDM: CPT

## 2021-06-07 NOTE — ED PROVIDER NOTE - CLINICAL SUMMARY MEDICAL DECISION MAKING FREE TEXT BOX
Pt nontoxic appearing, stable vitals, ambulatory with stable saturation without supplemental oxygen. PT does not meet criteria listed in most updated guidelines as per [facility] protocol/algorithm for admission at this time. pt advised about self-quarantine instructions until negative test results and/or symptom resolution. pt advised on hand hygiene, monitoring of symptoms, antipyretic use as well as and fu with primary care provider. Instructions given in pre-printed copy.

## 2021-06-07 NOTE — ED PROVIDER NOTE - PATIENT PORTAL LINK FT
You can access the FollowMyHealth Patient Portal offered by [facility] by registering at the following website: [email or web address]. By joining [facility]’s FollowMyHealth portal, you will also be able to view your health information using other applications (apps) compatible with our system.

## 2021-12-01 PROCEDURE — G9005: CPT

## 2022-11-08 NOTE — ED STATDOCS - ENMT, MLM
Inpatient Medical Oncology Progress Note    Subjective:  No major events overnight. Pt did not have paracentesis done, but is awaiting for this to be done today. States abdomen feels distended. Objective:  BP (!) 142/64   Pulse 73   Temp 98.4 °F (36.9 °C) (Oral)   Resp 18   Ht 5' 1\" (1.549 m)   Wt 127 lb (57.6 kg)   SpO2 98%   BMI 24.00 kg/m²   GENERAL: Alert, oriented x 3, tired  HEENT:  Oropharynx clear. LUNGS: Lung sounds distant, without overt wheezes. CVS: RRR, no rubs/gallops/murmurs  GI: Abdomen a little more distended today  EXTREMITIES: Without clubbing, cyanosis, or edema. NEUROLOGIC: No focal deficits. ECOG PS 2    Diagnostics:  Lab Results   Component Value Date    WBC 6.4 11/08/2022    HGB 7.8 (L) 11/08/2022    HCT 24.6 (L) 11/08/2022    MCV 86.3 11/08/2022     11/08/2022     Lab Results   Component Value Date     11/08/2022    K 4.4 11/08/2022     11/08/2022    CO2 20 (L) 11/08/2022    BUN 39 (H) 11/08/2022    CREATININE 1.4 (H) 11/08/2022    GLUCOSE 90 11/08/2022    CALCIUM 8.3 (L) 11/08/2022    PROT 6.0 (L) 11/08/2022    LABALBU 2.1 (L) 11/08/2022    BILITOT <0.2 11/08/2022    ALKPHOS 186 (H) 11/08/2022    AST 20 11/08/2022    ALT 15 11/08/2022    LABGLOM 40 11/08/2022    GFRAA 49 09/14/2022     Impression/Plan:  70 y.o. female with Hx of smoking who underwent: (1) Bronchoscopy. (2) Right VATS. (3) VATS right upper lobe wedge resection. (4) VATS right upper lobectomy. (5) Intercostal nerve block from T3 to T10. (6) Mediastinal lymph node dissection on 11/11/2015:   Pathology:  Right lung, upper lobectomy: Invasive, moderately differentiated adenocarcinoma (Grade 2). Tumor size 1.5 cm in greatest dimension. Visceral pleural invasion: Not identified. Visceral pleural invasion: Not identified. Surgical margin negative for malignancy. Two of three peribronchial lymph nodes positive for adenocarcinoma.     pT1a N1 MX;      Being followed for a left upper lobe mass by Dr. Luis Tillman. Multiple biopsies in the past came back negative for malignancy. Hypermetabolic on PET/CT scan on 09/29/2015 (2.3 cm in size with SUV of 6.4). CT guided biopsy of the left upper lobe lesion on 11/02/2015 was noted to be negative for malignancy. She was referred to the medical oncology clinic to discuss adjuvant chemotherapy. We recommended 4 cycles of adjuvant chemotherapy consisting of Carboplatin/Alimta. Mediport placed 12/7/15. -MRI Brain on 12/8/15:  Negative for metastatic disease.   -We will repeat CT chest and PET/CT after 4 cycles of Carboplatin/Alimta. To follow on Lingular lesion as well. -Molecular studies (EGFR, ALK, ROS-1) were all Not Detected. Cycle # 1 of Deepali Downs was on 12/09/2015. Cycle # 2 of Deepali Downs was on 01/06/2016. Cycle # 3 of Deepali Downs was on 01/27/2016. Cycle # 4 of Deepali Downs was on 02/24/2016. PET SCAN 3.2016: The 2.1 cm left lung mass abutting the major fissure is hypermetabolic with SUV max of 5.4. Finding is worrisome for tumor with avid glucose metabolism. 2. Elsewhere there is unremarkable distribution of FDG activity without evidence of hypermetabolic metastases. On 03/29/2016 underwent Bronch/Left VATS/VATS wedge ROMELIA/VATS Left upper lobectomy/Mediasinal lymph node dissection/Intercostal nerve block from T3-T10 per Dr. Oneta Primrose. Final pathology revealed Stage I Adenocarcinoma of ROMELIA (morphologically different from the adenocarcinoma previously diagnosed in the right upper lobe. Therefore, synchronous primary tumors are favored). A. Left lung, upper lobe wedge resection: Invasive, well-differentiated adenocarcinoma (grade 1); Tumor size-1.4 cm in greatest dimension; Surgical margins-negative for malignancy; Lymphovascular invasion-not identified; TNM classification-pT2a N0 MX  B. AP window lymph node #1, excision: Anthracotic lymph node; negative for malignancy  C.  AP window lymph node #2, excision: Anthracotic lymph node; negative for malignancy   D. Periaortic lymph node #1, excision: Fibroadipose tissue; lymph node not identified  E. Bronchial lymph node #1, excision: Anthracotic lymph node; negative for malignancy  F. Left lung, upper lobectomy: Emphysematous change; negative for malignancy  4 anthracotic lymph nodes negative for malignancy   G. Inferior pulmonary ligament lymph node, excision: Anthracotic lymph node; negative for malignancy  H. Bronchial lymph node, excision: Anthracotic lymph node; negative for malignancy. Right side Mediport was removed on 11/04/2016 by Dr. Ml Lynn. On surveillance per NCCN guidelines. CT chest 04/12/2017 noted no convincing evidence of recurrent disease. CT chest 10/19/2017 noted no definite evidence for recurrent malignancy. CT chest 03/12/2018 negative for pulmonary parenchymal masses or enlarged mediastinal or hilar LN. ? 2 cm lesion in spleen difficult to evaluate due to the arterial phase of the study. CT Abd/Pelvis 05/08/2018  Enhancing lesion within the spleen is relatively stable to slightly smaller when compared to April 2014 exam and likely splenic hemangioma. Other indeterminate findings (left periaortic LN, sclerotic/blastic foci in L3 and L1 reported by Dr. Adenike Collazo from radiology team). PET/CT scan 06/05/2018 unremarkable. No FDG avid uptake identified. No evidence for recurrent or metastatic disease. CT Chest 12/13/2018 noted no evidence of recurrent/metastatic disease. CT chest on 06/11/2019 noted no evidence for worrisome residual or recurrent malignancy. No evidence for new lymphadenopathy or metastatic disease. Stable scarring and postoperative changes right upper lobe. CT chest 11/26/2019: No evidence of active neoplasm. CT chest 06/15/2020: No evidence of active neoplasm. CT chest 12/30/2020: Postsurgical changes and postsurgical scarring seen within the right lung apex. There is no evidence of tumor recurrence.   2.1 x 2.3 cm enhancing lesion seen within the spleen  PET/CT scan 03/02/2021   No FDG avid uptake is identified which exceeds the threshold SUV. No convincing evidence for recurrent or metastatic disease  CT chest 09/28/2021 No evidence of tumor recurrence     Recent abdominal pain; ER visit reviewed  CT abdomen/pelvis 02/20/2022   6 mm right lower lobe pulmonary nodule  Multiple peritoneal cystic masses      CEA 12.5 on 03/16/2022     CT chest 04/05/2022 Ground-glass nodule right lower lobe superolateral portion 10 mm unchanged from prior however in the medial segment right lower lobe with pleural abutment is a 7 mm pulmonary nodule increased in size from 09/28/2021 with is barely visible at 2-3 mm; repeat CT chest in 3 mo     PET/CT scan 04/05/2022 noted No FDG avid uptake is identified which exceeds the threshold SUV      Bilateral screening mammogram 04/20/2022: Negative for malignancy     CEA     12.1 on 04/20/2022  CA-125 32.8 on 04/20/2022   <2 on 04/20/2022  Chromogranin A 1480 on 04/20/2022. EGD/Colonoscopy 05/13/2022: Gastric polyps , duodenal polyp moderate gastroduodenitis   A. Stomach, biopsy: Hyperplastic polyp and moderate chronic active gastritis; negative for intestinal metaplasia   Immunostain negative for Helicobacter pylori organisms   B. Duodenum, biopsy: Chronic duodenitis with features of peptic duodenitis   C. Colon, 20 cm biopsy: Fragments of tubular adenoma and hyperplastic polyp   Pathology reviewed. Referred to HBP team (Dr. Bart Bravo) for further evaluation of peritoneal cystic masses  CT abdomen/pelvis 06/23/2022 numerous cystic structures identified throughout the right flank and retroperitoneum. Laparoscopic robotic peritoneal mass resection on 07/06/2022  Findings included: serous cystic masses along the colon, periportal lymphadenopathy, fibrotic appearing liver, chronic cholecystitis     Peritoneal fluid Negative for malignant cells.  Cellblock shows reactive mesothelial cells, lymphocytes, adipose/stromal tissue, and blood. Monolayer preparation shows few reactive mesothelial cells and blood. A.  Lymph node, periportal, biopsy:   - Reactive node showing follicular hyperplasia, negative for malignancy, see comment. B.  Remnant gallbladder, cholecystectomy:   - Portion of gallbladder wall showing dense fibrosis/scar and occluded mariaelena-gallbladder vessels. C. Soft tissue, peritoneal sac, excision:   - Peritoneal inclusion cysts (benign cystic mesothelioma) and unremarkable fibroadipose tissue. D.  Liver, needle biopsy:   - Benign hepatic parenchyma showing focal periportal and incomplete septal fibrosis (stage 2)   - Negative for significant lobular/portal necroinflammatory activity, see comment. Comment:   Sections of the lymph node in specimen A reveal normal anat architecture with secondary follicular hyperplasia. There are no cytologically atypical lymphoid cells or [place] cells identified. Periportal LN Flow Cytometry noted 4.5% monoclonal B cells detected in a predominantly polyclonal background. Monoclonal B cell population (4.5% of total cells) without detectable CD5, CD10 or CD11c expression in a predominantly polyclonal background, raising the differential between a monoclonal B-cell population of undetermined significance versus a B-cell lymphoma/leukemia. In the context of B-cell lymphoma the main differential based on immunophenotype includes, but is not limited to marginal zone lymphoma/leukemia, lymphoplasmacytic lymphoma, TB86- negative follicular lymphoma, and less likely large b cell lymphoma. In specimen D, there is no evidence of chronic hepatitis or significant steatosis. Histologic features of cirrhosis including nodules of regenerating hepatocytes and complete portal-portal bridging fibrosis are   not identified. Focal periportal fibrosis and incomplete septal fibrosis are confirmed by trichrome stain.   Iron stain is also performed and is negative. Periportal lymph node flow cytometry showing 4.5% monoclonal B cells without detectable CD5, CD10 or CD11c expression in a predominantly polyclonal background, raising the differential between a monoclonal B-cell population of undetermined significance versus a B-cell lymphoma/leukemia     Repeat CT chest to follow on history of NSCLC on 08/04/2022  Postsurgical changes are identified in the upper lobes bilaterally. 1.1 cm ground-glass nodule in the right lower lobe and a smaller 1 measuring 0.9 cm are noted without change. There is a 1.6 x 1.2 cm soft tissue mass in the right lower lobe medially which is significantly increased since the previous examination. Bilateral adrenal nodules are noted, larger 1 on the left side measuring 1.8 x 1.6 cm.  1.2 cm right renal cystic lesion is identified. An ill-defined hypodense lesion is identified in the spleen currently measuring 2.9 x 3.5 cm      Evaluated by Dr. Darwin Keys on 08/15/2022 at [facility] who recommended proceeding with a biopsy of the enlarging right lower lobe lung mass. While lymphoma certainly possible, biopsy recommended to rule out recurrent lung cancer. Periportal lymph node biopsy reviewed at [facility]. Lymph node with lipid lymphadenopathy  Small clonal B-cell population detected by flow cytometry and molecular analysis  Negative for carcinoma  Flow cytometry showed a small clonal CD5 -/CD10- B-cell population (4.5% of all events) and a background of polyclonal B cells. B-cell receptor clonality assay was positive for a clonal B-cell population. However there is no morphologic evidence of lymphoma in the lymph node. The detection of a clonal B-cell population despite negative morphologic evidence of lymphoma might represent monoclonal B-cell lymphocytosis process in the lymph node.   An alternative consideration is peripheral blood involvement by B-cell clone (MBL-like process) that was picked up by the tissue flow cytometry and molecular tests. Even if lymphoma is concerned, likely low-grade and not causing her symptoms, so observation would be recommended. PET/CT scan, CT-guided core needle biopsy of enlarging right lower lobe lung mass recommended. PET/CT scan 09/06/2022: In the region of the lesion in the right lower lobe there is FDG avid tracer uptake peak SUV is 3.2. imaging reviewed. CT guided core needle biopsy RLL lung nodule on 09/20/2022  Right lung, lower lobe core needle biopsy: Adenocarcinoma (see comment)   Comment: The prior history of right upper lobe adenocarcinoma (HES ) and left upper lobe adenocarcinoma (HES ) is noted. The electronic medical record is also reviewed. The adenocarcinoma in the current specimen is weakly immunoreactive with GATA3 and CDX2. The TTF-1 immunostain is negative. PD-L1 54I4 FDA for NSCLC: PD-L1 EXPRESSION   Tumor proportion score: 10%   Intensity: 2+     EGFR Mutation: Not detected   ALK Rearrangement: Not detected (negative)   ROS1 Gene Rearrangement: Not detected (negative)   BRAF Mutation: Not detected   KRAS Mutation: KRAS Exon 2 DETECTED   Mutation-c.34 G >T (p.G12C)     MET Exon 14 Deletion Analysis: Not detected   RET: Not detected   NTRK 1, 2, 3: Not detected     Admitted for LE edema and abdominal ascites     Pathology from 7/6/2022 surgery mentioned fibrosis in liver focal periportal and incomplete septal fibrosis (stage II) but no evidence of chronic hepatitis/steatosis or cirrhosis. Status paracentesis 11/2/2022, total of 2450 mL of ascitic fluid was removed  Cytology pending  CEA 8.5  =5127  CA 19-9 <2  Anemia, likely secondary to malignancy, chronic inflammation, and iron deficiency    Pelvic U/S large calcified uterine mass; ovaries not identified. Large volume ascites.    MRI Pelvis w/out contrast done and I reviewed results    Hb 8.2 today ; Transfuse if Hb <7.0    Right breast breast nodule was detect on CT abd/pelvis  Breast exam done on 11/6/22 and a 2 cm nodule subareolar was palpated on right breast. Chaperone/RN present  Both  and CA 27.29 elevated at 68 and 85, respectively    Rad Onc initially planned for definitive RT for her adenocarcinoma lung mass before onset of ascites. Seen by Dr. Shawn Sol on 11/4 inpatient. Currently RT is on hold while workup the ascites  The initial ascites fluid from earlier during hospitalization likely lost/disposed, so not able to run fluid analysis studies or cytology  Gyn saw patient on 11/7/22. I reviewed consultation. I also spoke with pathology today regarding her lung biopsy. No new stains at this time. Pt reports abdomen becoming more distended. Repeat paracentesis not done yesterday, but likely will be done today. Fluid studies including cytology has been ordered. I reviewed PET scan, noted the right breast nodule present with small amount of uptake. I called Dr. Guzman Velázquez with radiology, and noted an SUV estimate of 1.5 but does not overtly suggest malignancy. He otherwise suggested breast ultrasound as well, cannot be done inpatient, so will do outpatient; inpatient breast u/s was cancelled. Dispo plan are to transfer to rehab per primary.   As discharge is being planned, will reach out to our office to regarding further management moving forward    UPDATE 11/09/22 7:08 AM: HCV PCR is positive with >300,000 viral load    MD Christopher Balbuena 18  11/08/22 2:16 PM Nasal mucosa clear.  Mouth with normal mucosa  Throat has no vesicles, no oropharyngeal exudates and uvula is midline.

## 2023-02-01 ENCOUNTER — APPOINTMENT (OUTPATIENT)
Dept: ORTHOPEDIC SURGERY | Facility: CLINIC | Age: 58
End: 2023-02-01
Payer: MEDICAID

## 2023-02-01 DIAGNOSIS — M75.82 OTHER SHOULDER LESIONS, LEFT SHOULDER: ICD-10-CM

## 2023-02-01 PROCEDURE — 20611 DRAIN/INJ JOINT/BURSA W/US: CPT | Mod: LT

## 2023-02-01 PROCEDURE — 73030 X-RAY EXAM OF SHOULDER: CPT | Mod: 26,LT

## 2023-02-01 PROCEDURE — 99214 OFFICE O/P EST MOD 30 MIN: CPT | Mod: 25

## 2023-02-01 NOTE — PHYSICAL EXAM
[de-identified] : Physical Exam: \par General: Well appearing, no acute distress \par Neurologic: A&Ox3, No focal deficits \par Head: NCAT without abrasions, lacerations, or ecchymosis to head, face, or scalp \par Eyes: No scleral icterus, no gross abnormalities \par Respiratory: Equal chest wall expansion bilaterally, no accessory muscle use \par Lymphatic: No lymphadenopathy palpated \par Skin: Warm and dry \par Psychiatric: Normal mood and affect\par \par Examination of the Left shoulder shows no obvious deformity, swelling or erythema. Mild tenderness to palpation over the anterior shoulder. No AC joint tenderness. The patient demonstrates active/passive ROM of Forward Flexion to 145 degrees, External Rotation to 40 degrees and Internal Rotation to a mid lumbar level. The patient has a positive Rosas and Neers test. No pain with cross body adduction, lift off testing, AC compression testing or Yergason testing. The patient has 4/5 strength to forward flexion with pronation, internal and external rotation. Compartments are soft and nontender. The patient has 2+ cap refill and sensation is intact in the hand. \par \par Right shoulder shows no deformity. No tenderness to palpation over the biceps or AC joint. The patient has Forward Flexion to 170 degrees, External Rotation to 45 degrees and Internal Rotation to a mid lumbar level. 5/5 strength to forward flexion with pronation, internal and external rotation. Compartments are soft and nontender. 2+ cap refill. Sensation intact distally.\par  [de-identified] : 4 views of L shoulder were performed today and available for me to review. Results were discussed with the patient. They demonstrate no f/x, dislocation or other deformity.\par

## 2023-02-01 NOTE — PROCEDURE
[de-identified] : Injection: US guided Left shoulder (Subacromial).    \par \par A discussion was had with the patient regarding this procedure and all questions were answered. All risks, benefits and alternatives were discussed. These included but were not limited to bleeding, infection, and allergic reaction. Alcohol was used to clean the skin, and ChloraPrep was used to sterilize and prep the area in the posterior aspect of the left shoulder. Ethyl chloride spray was then used as a topical anesthetic. A 22-gauge needle was used to inject 3cc 1% xylocaine, 2cc 0.25% bupivacaine and 1cc of 40mg/mL triamcinolone acetonide into the left subacromial space. Ultrasound guidance was used for localization. A sterile band aid was then applied. The patient tolerated the procedure well and there were no complications. Post injection instructions were given.

## 2023-02-01 NOTE — HISTORY OF PRESENT ILLNESS
[Worsening] : worsening [___ wks] : [unfilled] week(s) ago [4] : a current pain level of 4/10 [5] : an average pain level of 5/10 [3] : a minimum pain level of 3/10 [6] : a maximum pain level of 6/10 [Direct Pressure] : worsened by direct pressure [Lifting] : worsened by lifting [de-identified] : PHI RAMOS is a 57 year male being seen for f/u visit L shoulder pain. At last visit > 2 years ago, patient received L shoulder subacromial injection with significant relief. Currently, he reports his L shoulder pain returned approximately 2 weeks ago. He reports he is unable to sleep at night due to pain. He is hoping for another cortisone injection today.  He reports the pain began atraumatically.  He is a type II diabetic and his last A1c was 5.9.  He is on oral hypoglycemics.  Localizes pain diffusely around the shoulder.  He denies radiating pain.  He has a prior MRI which showed a partial-thickness rotator cuff tear.

## 2023-02-01 NOTE — DISCUSSION/SUMMARY
[de-identified] : I had a lengthy discussion with the patient regarding their current condition. We discussed the treatment options including operative and nonoperative management. At this time I recommended conservative management.  I offered to send him to physical therapy but he would prefer to perform a home exercise program.  In terms of the shoulder I offered him a repeat injection he like to proceed.  I advised the patient for potential elevation of their blood glucose levels over the next 72 hours. I advised them to monitor their sugars closely and contact their primary care doctor or endocrinologist should this occur. Verbal understanding was expressed.  Patient can apply ice and moist heat.  He will follow-up with us as needed.  All questions were answered.

## 2023-09-07 NOTE — ED PROVIDER NOTE - IV ALTEPASE ADMIN HIDDEN
show Ilumya Pregnancy And Lactation Text: The risk during pregnancy and breastfeeding is uncertain with this medication.

## 2025-06-05 ENCOUNTER — EMERGENCY (EMERGENCY)
Facility: HOSPITAL | Age: 60
LOS: 1 days | End: 2025-06-05
Attending: EMERGENCY MEDICINE
Payer: COMMERCIAL

## 2025-06-05 VITALS
RESPIRATION RATE: 16 BRPM | DIASTOLIC BLOOD PRESSURE: 71 MMHG | HEART RATE: 117 BPM | SYSTOLIC BLOOD PRESSURE: 120 MMHG | WEIGHT: 229.94 LBS | TEMPERATURE: 99 F

## 2025-06-05 PROCEDURE — 70486 CT MAXILLOFACIAL W/O DYE: CPT

## 2025-06-05 PROCEDURE — 82962 GLUCOSE BLOOD TEST: CPT

## 2025-06-05 PROCEDURE — 90471 IMMUNIZATION ADMIN: CPT

## 2025-06-05 PROCEDURE — 99285 EMERGENCY DEPT VISIT HI MDM: CPT | Mod: 25

## 2025-06-05 PROCEDURE — 70450 CT HEAD/BRAIN W/O DYE: CPT

## 2025-06-05 PROCEDURE — 73564 X-RAY EXAM KNEE 4 OR MORE: CPT

## 2025-06-05 PROCEDURE — 72125 CT NECK SPINE W/O DYE: CPT | Mod: 26

## 2025-06-05 PROCEDURE — 12011 RPR F/E/E/N/L/M 2.5 CM/<: CPT

## 2025-06-05 PROCEDURE — 73030 X-RAY EXAM OF SHOULDER: CPT | Mod: 26,LT

## 2025-06-05 PROCEDURE — 70450 CT HEAD/BRAIN W/O DYE: CPT | Mod: 26

## 2025-06-05 PROCEDURE — 90715 TDAP VACCINE 7 YRS/> IM: CPT

## 2025-06-05 PROCEDURE — 73030 X-RAY EXAM OF SHOULDER: CPT

## 2025-06-05 PROCEDURE — 72125 CT NECK SPINE W/O DYE: CPT

## 2025-06-05 PROCEDURE — 73564 X-RAY EXAM KNEE 4 OR MORE: CPT | Mod: 26,LT

## 2025-06-05 PROCEDURE — 70486 CT MAXILLOFACIAL W/O DYE: CPT | Mod: 26

## 2025-06-05 RX ORDER — CLOSTRIDIUM TETANI TOXOID ANTIGEN (FORMALDEHYDE INACTIVATED), CORYNEBACTERIUM DIPHTHERIAE TOXOID ANTIGEN (FORMALDEHYDE INACTIVATED), BORDETELLA PERTUSSIS TOXOID ANTIGEN (GLUTARALDEHYDE INACTIVATED), BORDETELLA PERTUSSIS FILAMENTOUS HEMAGGLUTININ ANTIGEN (FORMALDEHYDE INACTIVATED), BORDETELLA PERTUSSIS PERTACTIN ANTIGEN, AND BORDETELLA PERTUSSIS FIMBRIAE 2/3 ANTIGEN 5; 2; 2.5; 5; 3; 5 [LF]/.5ML; [LF]/.5ML; UG/.5ML; UG/.5ML; UG/.5ML; UG/.5ML
0.5 INJECTION, SUSPENSION INTRAMUSCULAR ONCE
Refills: 0 | Status: COMPLETED | OUTPATIENT
Start: 2025-06-05 | End: 2025-06-05

## 2025-06-05 RX ADMIN — CLOSTRIDIUM TETANI TOXOID ANTIGEN (FORMALDEHYDE INACTIVATED), CORYNEBACTERIUM DIPHTHERIAE TOXOID ANTIGEN (FORMALDEHYDE INACTIVATED), BORDETELLA PERTUSSIS TOXOID ANTIGEN (GLUTARALDEHYDE INACTIVATED), BORDETELLA PERTUSSIS FILAMENTOUS HEMAGGLUTININ ANTIGEN (FORMALDEHYDE INACTIVATED), BORDETELLA PERTUSSIS PERTACTIN ANTIGEN, AND BORDETELLA PERTUSSIS FIMBRIAE 2/3 ANTIGEN 0.5 MILLILITER(S): 5; 2; 2.5; 5; 3; 5 INJECTION, SUSPENSION INTRAMUSCULAR at 02:29

## 2025-06-05 NOTE — ED PROVIDER NOTE - PATIENT PORTAL LINK FT
You can access the FollowMyHealth Patient Portal offered by [facility] by registering at the following website: [email or web address]. By joining [employer]’s FollowMyHealth portal, you will also be able to view your health information using other applications (apps) compatible with our system.
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
## 2025-06-05 NOTE — ED PROVIDER NOTE - NSFOLLOWUPINSTRUCTIONS_ED_ALL_ED_FT
Your stitches will dissolve on their own.     Closed Head Injury    A closed head injury is an injury to your head that may or may not involve a traumatic brain injury (TBI).  A CT scan of the head may not have been performed because they are usually normal after a concussion. Concussions are diagnosed and managed based on the history given and the symptoms experienced after the head injury. Most concussions do not cause serious problem and get better over several days.  Symptoms of TBI can be short or long lasting and include headache, dizziness, interference with memory or speech, fatigue, confusion, changes in sleep, mood changes, nausea, depression/anxiety, and dulling of senses. Make sure to obtain proper rest which includes getting plenty of sleep, avoiding excessive visual stimulation, and avoiding activities that may cause physical or mental stress. Avoid any situation where there is potential for another head injury, including sports.    SEEK IMMEDIATE MEDICAL CARE IF YOU HAVE ANY OF THE FOLLOWING SYMPTOMS: unusual drowsiness, vomiting, severe dizziness, seizures, lightheadedness, muscular weakness, different pupil sizes, visual changes, or clear or bloody discharge from your ears or nose.     Laceration    A laceration is a cut that goes through all of the layers of the skin and into the tissue that is right under the skin. Some lacerations heal on their own. Others need to be closed with skin adhesive strips, skin glue, stitches (sutures), or staples. Proper laceration care minimizes the risk of infection and helps the laceration to heal better.  If non-absorbable stitches or staples have been placed, they must be taken out within the time frame instructed by your healthcare provider.    SEEK IMMEDIATE MEDICAL CARE IF YOU HAVE ANY OF THE FOLLOWING SYMPTOMS: swelling around the wound, worsening pain, drainage from the wound, red streaking going away from your wound, inability to move finger or toe near the laceration, or discoloration of skin near the laceration.

## 2025-06-05 NOTE — ED PROVIDER NOTE - PROGRESS NOTE DETAILS
Suleiman: pt clinically appropriate. steady gait. requesting d/c. no other complaints. return precautions.

## 2025-06-05 NOTE — ED PROVIDER NOTE - PHYSICAL EXAMINATION
Gen: No acute distress, non toxic  HEENT: Mucous membranes moist, pink conjunctivae, EOMI. ~3 cm horizontal lac above right eyebrow, ttp to left maxillary area, no proptosis, perrla.   Neck: mild ttp to generalized neck  CV: RRR, nl s1/s2.  Resp: CTAB, normal rate and effort  GI: Abdomen soft, NT, ND. No rebound, no guarding  : No CVAT  Neuro: A&O x 3, moving all 4 extremities  MSK: No spine or joint tenderness to palpation. mild ttp to left shoulder, knee.   Skin: superficial abrasion to left shoulder/right hand.
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
## 2025-06-05 NOTE — ED ADULT NURSE NOTE - NSFALLUNIVINTERV_ED_ALL_ED
Bed/Stretcher in lowest position, wheels locked, appropriate side rails in place/Call bell, personal items and telephone in reach/Instruct patient to call for assistance before getting out of bed/chair/stretcher/Non-slip footwear applied when patient is off stretcher/[place] to call system/Physically safe environment - no spills, clutter or unnecessary equipment/Purposeful proactive rounding/Room/bathroom lighting operational, light cord in reach
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
